# Patient Record
(demographics unavailable — no encounter records)

---

## 2024-10-28 NOTE — HISTORY OF PRESENT ILLNESS
[de-identified] : Patient is s/p right knee ACL reconstruction.  post op developed PE no instability still having medial jt line pain has gone to PM with no relief  NAD Right knee: Incisions healed, c/d/i ttp proximal MCL and MFC Mild swelling ROM 0-125 Neg lachman NVI Compartments soft and NT quad weakness improving  NEW MRI: ACL INTACT, NO MENISCUS/MCL TEAR  [facility] ultrasound shows 2 small tears in the mcl which open slightly with valgus stress  s/p right knee ACL reconstruction Went over surgery in detail explaiend the ultrasound rec continuation with pt as he states pt has been helping will fu after pt